# Patient Record
Sex: MALE | Race: AMERICAN INDIAN OR ALASKA NATIVE | NOT HISPANIC OR LATINO | Employment: FULL TIME | URBAN - METROPOLITAN AREA
[De-identification: names, ages, dates, MRNs, and addresses within clinical notes are randomized per-mention and may not be internally consistent; named-entity substitution may affect disease eponyms.]

---

## 2022-06-24 ENCOUNTER — OFFICE VISIT (OUTPATIENT)
Dept: INTERNAL MEDICINE | Facility: CLINIC | Age: 57
End: 2022-06-24
Payer: COMMERCIAL

## 2022-06-24 VITALS
SYSTOLIC BLOOD PRESSURE: 127 MMHG | TEMPERATURE: 98.2 F | DIASTOLIC BLOOD PRESSURE: 76 MMHG | HEART RATE: 69 BPM | BODY MASS INDEX: 26.04 KG/M2 | HEIGHT: 71 IN | WEIGHT: 186 LBS | OXYGEN SATURATION: 100 % | RESPIRATION RATE: 18 BRPM

## 2022-06-24 DIAGNOSIS — R41.3 MEMORY DEFICIT: Primary | ICD-10-CM

## 2022-06-24 PROBLEM — E66.811 OBESITY (BMI 30.0-34.9): Status: ACTIVE | Noted: 2018-03-06

## 2022-06-24 PROBLEM — Z86.0100 PERSONAL HISTORY OF COLONIC POLYPS: Status: ACTIVE | Noted: 2022-06-24

## 2022-06-24 PROBLEM — M54.50 CHRONIC LOW BACK PAIN: Status: ACTIVE | Noted: 2022-06-24

## 2022-06-24 PROBLEM — I10 ESSENTIAL HYPERTENSION: Status: ACTIVE | Noted: 2018-03-06

## 2022-06-24 PROBLEM — M17.9 OSTEOARTHRITIS OF KNEE: Status: ACTIVE | Noted: 2022-06-24

## 2022-06-24 PROBLEM — M54.30 SCIATICA: Status: ACTIVE | Noted: 2022-06-24

## 2022-06-24 PROBLEM — F41.1 GENERALIZED ANXIETY DISORDER: Status: ACTIVE | Noted: 2022-06-24

## 2022-06-24 PROBLEM — G89.29 CHRONIC LOW BACK PAIN: Status: ACTIVE | Noted: 2022-06-24

## 2022-06-24 PROBLEM — F33.9 RECURRENT MAJOR DEPRESSION (H): Status: ACTIVE | Noted: 2022-06-24

## 2022-06-24 PROBLEM — F90.0 ATTENTION DEFICIT HYPERACTIVITY DISORDER, PREDOMINANTLY INATTENTIVE TYPE: Status: ACTIVE | Noted: 2022-06-24

## 2022-06-24 PROBLEM — G47.33 OBSTRUCTIVE SLEEP APNEA: Status: ACTIVE | Noted: 2022-06-24

## 2022-06-24 PROBLEM — Z96.649 HISTORY OF TOTAL HIP ARTHROPLASTY: Status: ACTIVE | Noted: 2022-06-24

## 2022-06-24 PROBLEM — R73.01 IFG (IMPAIRED FASTING GLUCOSE): Status: ACTIVE | Noted: 2018-03-07

## 2022-06-24 PROBLEM — F31.81 BIPOLAR II DISORDER (H): Status: ACTIVE | Noted: 2018-03-06

## 2022-06-24 PROCEDURE — 99203 OFFICE O/P NEW LOW 30 MIN: CPT | Performed by: INTERNAL MEDICINE

## 2022-06-24 RX ORDER — DEXTROAMPHETAMINE SACCHARATE, AMPHETAMINE ASPARTATE MONOHYDRATE, DEXTROAMPHETAMINE SULFATE AND AMPHETAMINE SULFATE 5; 5; 5; 5 MG/1; MG/1; MG/1; MG/1
20 CAPSULE, EXTENDED RELEASE ORAL DAILY
COMMUNITY

## 2022-06-24 RX ORDER — LAMOTRIGINE 150 MG/1
300 TABLET ORAL
COMMUNITY
Start: 2019-01-23

## 2022-06-24 RX ORDER — NALTREXONE HYDROCHLORIDE 50 MG/1
1 TABLET, FILM COATED ORAL DAILY
COMMUNITY
Start: 2022-04-06

## 2022-06-24 RX ORDER — LISINOPRIL 40 MG/1
20 TABLET ORAL
COMMUNITY
Start: 2022-04-29

## 2022-06-24 ASSESSMENT — ENCOUNTER SYMPTOMS
CARDIOVASCULAR NEGATIVE: 1
GASTROINTESTINAL NEGATIVE: 1
CONSTITUTIONAL NEGATIVE: 1
NEUROLOGICAL NEGATIVE: 1
CONFUSION: 1
RESPIRATORY NEGATIVE: 1
DECREASED CONCENTRATION: 1
MUSCULOSKELETAL NEGATIVE: 1
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
## 2022-07-24 ENCOUNTER — HEALTH MAINTENANCE LETTER (OUTPATIENT)
Age: 57
End: 2022-07-24

## 2022-10-03 ENCOUNTER — HEALTH MAINTENANCE LETTER (OUTPATIENT)
Age: 57
End: 2022-10-03

## 2022-10-03 NOTE — TELEPHONE ENCOUNTER
11/18/2022-Request for records and Images faxed to [facility] @ 902am    11/23/2022-[facility] @ 534am    FUTURE VISIT INFORMATION      FUTURE VISIT INFORMATION:    Date: 11/25/2022    Time: 8am    Location: [facility]  REFERRAL INFORMATION:    Referring provider:  Dr. Lewis    Referring providers clinic:  [facility]     Reason for visit/diagnosis  Memory Concerns     RECORDS REQUESTED FROM:       Clinic name Comments Records Status Imaging Status   Internal Dr. Lewis-6/24/2022 Epic No Images         [facility]   Requested  Requested

## 2022-10-26 ENCOUNTER — TRANSFERRED RECORDS (OUTPATIENT)
Dept: HEALTH INFORMATION MANAGEMENT | Facility: CLINIC | Age: 57
End: 2022-10-26

## 2022-11-25 ENCOUNTER — PRE VISIT (OUTPATIENT)
Dept: NEUROLOGY | Facility: CLINIC | Age: 57
End: 2022-11-25

## 2022-11-25 ENCOUNTER — OFFICE VISIT (OUTPATIENT)
Dept: NEUROLOGY | Facility: CLINIC | Age: 57
End: 2022-11-25
Attending: INTERNAL MEDICINE
Payer: COMMERCIAL

## 2022-11-25 ENCOUNTER — LAB (OUTPATIENT)
Dept: LAB | Facility: CLINIC | Age: 57
End: 2022-11-25
Payer: COMMERCIAL

## 2022-11-25 VITALS — DIASTOLIC BLOOD PRESSURE: 79 MMHG | SYSTOLIC BLOOD PRESSURE: 143 MMHG | HEART RATE: 65 BPM | OXYGEN SATURATION: 97 %

## 2022-11-25 DIAGNOSIS — R41.3 MEMORY DEFICIT: ICD-10-CM

## 2022-11-25 DIAGNOSIS — R27.0 ATAXIA: Primary | ICD-10-CM

## 2022-11-25 LAB
MAGNESIUM SERPL-MCNC: 1.8 MG/DL (ref 1.7–2.3)
TSH SERPL DL<=0.005 MIU/L-ACNC: 1.06 UIU/ML (ref 0.3–4.2)
VIT B12 SERPL-MCNC: 374 PG/ML (ref 232–1245)

## 2022-11-25 PROCEDURE — 99205 OFFICE O/P NEW HI 60 MIN: CPT | Performed by: PSYCHIATRY & NEUROLOGY

## 2022-11-25 PROCEDURE — 82607 VITAMIN B-12: CPT | Performed by: PATHOLOGY

## 2022-11-25 PROCEDURE — 83735 ASSAY OF MAGNESIUM: CPT | Performed by: PATHOLOGY

## 2022-11-25 PROCEDURE — 84443 ASSAY THYROID STIM HORMONE: CPT | Performed by: PATHOLOGY

## 2022-11-25 PROCEDURE — 99000 SPECIMEN HANDLING OFFICE-LAB: CPT | Performed by: PATHOLOGY

## 2022-11-25 PROCEDURE — 36415 COLL VENOUS BLD VENIPUNCTURE: CPT | Performed by: PATHOLOGY

## 2022-11-25 PROCEDURE — 84425 ASSAY OF VITAMIN B-1: CPT | Mod: 90 | Performed by: PATHOLOGY

## 2022-11-25 ASSESSMENT — PAIN SCALES - GENERAL: PAINLEVEL: NO PAIN (0)

## 2022-11-25 NOTE — PATIENT INSTRUCTIONS
I think you have some ataxia on exam, and given your alcohol history I think you should have an image of your brain to look for changes that can lead to cerebellar atrophy or mamillary body atrophy.    - MRI brain w/wout contrast  - B1, Mg, b12, TSH
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
## 2022-11-30 LAB — VIT B1 PYROPHOSHATE BLD-SCNC: 124 NMOL/L

## 2022-12-01 ENCOUNTER — HOSPITAL ENCOUNTER (OUTPATIENT)
Dept: MRI IMAGING | Facility: CLINIC | Age: 57
Discharge: HOME | End: 2022-12-01
Attending: PSYCHIATRY & NEUROLOGY | Admitting: PSYCHIATRY & NEUROLOGY
Payer: COMMERCIAL

## 2022-12-01 DIAGNOSIS — R41.3 MEMORY DEFICIT: ICD-10-CM

## 2022-12-01 DIAGNOSIS — R27.0 ATAXIA: ICD-10-CM

## 2022-12-01 PROCEDURE — 70551 MRI BRAIN STEM W/O DYE: CPT

## 2023-06-04 ENCOUNTER — HEALTH MAINTENANCE LETTER (OUTPATIENT)
Age: 58
End: 2023-06-04

## 2024-07-28 ENCOUNTER — HEALTH MAINTENANCE LETTER (OUTPATIENT)
Age: 59
End: 2024-07-28

## 2025-08-10 ENCOUNTER — HEALTH MAINTENANCE LETTER (OUTPATIENT)
Age: 60
End: 2025-08-10